# Patient Record
Sex: FEMALE | Race: WHITE
[De-identification: names, ages, dates, MRNs, and addresses within clinical notes are randomized per-mention and may not be internally consistent; named-entity substitution may affect disease eponyms.]

---

## 2018-12-19 ENCOUNTER — HOSPITAL ENCOUNTER (OUTPATIENT)
Dept: HOSPITAL 74 - JASU-SURG | Age: 48
Discharge: HOME | End: 2018-12-19
Attending: SURGERY
Payer: COMMERCIAL

## 2018-12-19 VITALS — BODY MASS INDEX: 22.1 KG/M2

## 2018-12-19 VITALS — TEMPERATURE: 98 F | SYSTOLIC BLOOD PRESSURE: 125 MMHG | HEART RATE: 62 BPM | DIASTOLIC BLOOD PRESSURE: 71 MMHG

## 2018-12-19 DIAGNOSIS — N60.11: Primary | ICD-10-CM

## 2018-12-19 DIAGNOSIS — N60.91: ICD-10-CM

## 2018-12-19 PROCEDURE — 0HBT0ZX EXCISION OF RIGHT BREAST, OPEN APPROACH, DIAGNOSTIC: ICD-10-PCS | Performed by: SURGERY

## 2018-12-20 NOTE — OP
DATE OF OPERATION:  12/19/2018

 

PREOPERATIVE DIAGNOSIS:  Right breast microcalcification.

 

POSTOPERATIVE DIAGNOSIS:  Right breast microcalcification.

 

PROCEDURE:  Right breast wire-localized excisional biopsy.

 

SURGEON:  Lorna Isaac MD

 

ANESTHESIA:  General.

 

ESTIMATED BLOOD LOSS:  Minimal.

 

COMPLICATIONS:  None.

 

This was a sterile procedure.

 

INDICATION FOR PROCEDURE:  Patient presented with a screening mammogram that noted

loosely-clustering microcalcifications in the subareolar and upper outer right

breast.  The radiologist recommended a biopsy.  They tried a stereotactic biopsy

which was non-successful.  Therefore, the plan was an excisional biopsy to sample

some of these calcifications.  The procedure was discussed with her and all her

questions answered.

 

PROCEDURE IN DETAIL:  Patient was brought to [facility] in

[place], taken down to breast imaging where Dr. Branch localized calcifications in

the upper outer right breast.  I reviewed the images with him, and we felt that the

calcifications were likely deeper compared to the hook.  These were marked.

 

She was then brought up to the operating room, and after induction of general

anesthesia and IV antibiotics, the right breast was prepped and draped in usual

sterile fashion.  The area in the upper outer right breast was anesthetized with 1%

lidocaine without epinephrine.  A curvilinear incision was made in the upper outer

right breast, and the wire was used as a guide to get down to the area of interest

which was excised en bloc and sent for specimen radiograph.  Specimen radiograph

showed the wire to be intact within the specimen, and there were some calcifications

at the edge which were again loosely clustered.  The specimen was then sent to

Pathology for permanent section.  Hemostasis was assured with electrocautery.  The

parenchyma approximated with interrupted 3-0 Vicryl, skin approximated with

interrupted 3-0 Vicryl running and 4-0 Biosyn.  A sterile dressing with Tegaderm and

4 x 4's applied.  She tolerated the procedure well, was extubated on the operating

room table, and taken to Recovery in good condition.

 

 

YURI [id number]

DD: 12/19/2018 15:27

DT: 12/20/2018 08:08

Job #:  [id number]

## 2018-12-21 NOTE — PATH
Surgical Pathology Report



Patient Name:  HUGO APODACA

Accession #:  [id number]

[facility]. Rec. #:  [id number]                                                        

   /Age/Gender:  1970 (Age: 48) / F

Account:  [id number]                                                          

             Location: [facility] SURGICAL

Taken:  2018

Received:  2018

Reported:  2018

Physicians:  Lorna Isaac M.D.

  



Specimen(s) Received

 RIGHT BREAST EXCISIONAL BIOPSY 





Clinical History

Microcalcification







Final Diagnosis

BREAST, RIGHT, EXCISION:

BENIGN BREAST PARENCHYMA WITH FIBROCYSTIC CHANGES INCLUDING STROMAL FIBROSIS,

MICROCYSTS, APOCRINE METAPLASIA, FOCAL SCLEROSING ADENOSIS, USUAL DUCTAL

HYPERPLASIA, AND ASSOCIATED MICROCALCIFICATIONS.





Comment: Findings discussed with Dr. Isaac.







***Electronically Signed***

Valeria Aviles M.D.





Gross Description

Received fresh on an [employer] labeled "right breast excisional biopsy," is a 4.2

x 3.5 x 1.0 cm portion of fibroadipose tissue with a needle localization wire

present. There are no orienting sutures present. There is no skin or nipple

present. The specimen is inked blue and serially sectioned. Sectioning reveals

foci of white fibrous tissue. No definitive mass is identified. The specimen is

entirely and sequentially submitted in 7 cassettes.



Total formalin fixation time: Approximately 9 hours

## 2019-06-17 ENCOUNTER — APPOINTMENT (OUTPATIENT)
Dept: ORTHOPEDIC SURGERY | Facility: CLINIC | Age: 49
End: 2019-06-17
Payer: COMMERCIAL

## 2019-06-17 VITALS
SYSTOLIC BLOOD PRESSURE: 107 MMHG | WEIGHT: 125 LBS | HEIGHT: 64 IN | DIASTOLIC BLOOD PRESSURE: 70 MMHG | BODY MASS INDEX: 21.34 KG/M2 | HEART RATE: 83 BPM

## 2019-06-17 DIAGNOSIS — S93.602A UNSPECIFIED SPRAIN OF LEFT FOOT, INITIAL ENCOUNTER: ICD-10-CM

## 2019-06-17 PROBLEM — Z00.00 ENCOUNTER FOR PREVENTIVE HEALTH EXAMINATION: Status: ACTIVE | Noted: 2019-06-17

## 2019-06-17 PROCEDURE — 73630 X-RAY EXAM OF FOOT: CPT | Mod: LT

## 2019-06-17 PROCEDURE — 99204 OFFICE O/P NEW MOD 45 MIN: CPT

## 2019-06-24 ENCOUNTER — APPOINTMENT (OUTPATIENT)
Dept: ORTHOPEDIC SURGERY | Facility: CLINIC | Age: 49
End: 2019-06-24
Payer: COMMERCIAL

## 2019-06-24 VITALS — DIASTOLIC BLOOD PRESSURE: 78 MMHG | HEART RATE: 81 BPM | SYSTOLIC BLOOD PRESSURE: 115 MMHG

## 2019-06-24 DIAGNOSIS — S52.502A UNSPECIFIED FRACTURE OF THE LOWER END OF LEFT RADIUS, INITIAL ENCOUNTER FOR CLOSED FRACTURE: ICD-10-CM

## 2019-06-24 PROCEDURE — 73110 X-RAY EXAM OF WRIST: CPT | Mod: LT

## 2019-06-24 PROCEDURE — 99213 OFFICE O/P EST LOW 20 MIN: CPT

## 2019-06-25 ENCOUNTER — APPOINTMENT (OUTPATIENT)
Dept: ORTHOPEDIC SURGERY | Facility: CLINIC | Age: 49
End: 2019-06-25

## 2019-06-27 ENCOUNTER — APPOINTMENT (OUTPATIENT)
Dept: ORTHOPEDIC SURGERY | Facility: HOSPITAL | Age: 49
End: 2019-06-27